# Patient Record
Sex: MALE | Race: WHITE | URBAN - METROPOLITAN AREA
[De-identification: names, ages, dates, MRNs, and addresses within clinical notes are randomized per-mention and may not be internally consistent; named-entity substitution may affect disease eponyms.]

---

## 2024-08-11 ENCOUNTER — OFFICE VISIT (OUTPATIENT)
Dept: FAMILY MEDICINE CLINIC | Facility: CLINIC | Age: 69
End: 2024-08-11
Payer: COMMERCIAL

## 2024-08-11 ENCOUNTER — HOSPITAL ENCOUNTER (EMERGENCY)
Age: 69
Discharge: LEFT BEFORE DISCHARGE | End: 2024-08-11

## 2024-08-11 VITALS
HEART RATE: 80 BPM | SYSTOLIC BLOOD PRESSURE: 142 MMHG | BODY MASS INDEX: 31.86 KG/M2 | DIASTOLIC BLOOD PRESSURE: 84 MMHG | TEMPERATURE: 98 F | WEIGHT: 203 LBS | OXYGEN SATURATION: 97 % | HEIGHT: 67 IN | RESPIRATION RATE: 20 BRPM

## 2024-08-11 DIAGNOSIS — R22.1 LOCALIZED SWELLING, MASS AND LUMP, NECK: ICD-10-CM

## 2024-08-11 DIAGNOSIS — H92.02 LEFT EAR PAIN: Primary | ICD-10-CM

## 2024-08-11 PROCEDURE — 99204 OFFICE O/P NEW MOD 45 MIN: CPT

## 2024-08-11 NOTE — PROGRESS NOTES
Subjective:   Patient ID: Bret Winslow is a 68 year old male.    Patient presents to clinic with complaints of left ear pain and left neck swelling for 1 month. States that symptoms began after some dental work. Denies fever.    Ear Pain   There is pain in the left ear. This is a new problem. The current episode started 1 to 4 weeks ago. The problem has been unchanged. There has been no fever. Pertinent negatives include no ear discharge. He has tried nothing for the symptoms.       History/Other:   Review of Systems   HENT:  Positive for ear pain. Negative for ear discharge.         Left neck swelling   All other systems reviewed and are negative.    No current outpatient medications on file.     Allergies:Not on File    Objective:   Physical Exam  Vitals reviewed.   Constitutional:       General: He is not in acute distress.     Appearance: Normal appearance. He is not ill-appearing.   HENT:      Head: Normocephalic and atraumatic.      Right Ear: Tympanic membrane, ear canal and external ear normal.      Left Ear: Tympanic membrane, ear canal and external ear normal.      Nose: Nose normal.      Mouth/Throat:      Mouth: Mucous membranes are moist.      Pharynx: Oropharynx is clear. No oropharyngeal exudate or posterior oropharyngeal erythema.   Neck:        Comments: Swelling to left neck  Cardiovascular:      Rate and Rhythm: Normal rate and regular rhythm.      Pulses: Normal pulses.      Heart sounds: Normal heart sounds.   Pulmonary:      Effort: Pulmonary effort is normal.      Breath sounds: Normal breath sounds.   Musculoskeletal:         General: Normal range of motion.      Cervical back: Normal range of motion. Edema present. No erythema. Normal range of motion.   Skin:     General: Skin is warm and dry.      Capillary Refill: Capillary refill takes less than 2 seconds.   Neurological:      General: No focal deficit present.      Mental Status: He is alert and oriented to person, place, and time.    Psychiatric:         Mood and Affect: Mood normal.         Behavior: Behavior normal.         Assessment & Plan:   1. Left ear pain    2. Localized swelling, mass and lump, neck        Patient referred to [facility] for further work up including possible imaging.      Meds This Visit:  Requested Prescriptions      No prescriptions requested or ordered in this encounter       Imaging & Referrals:  None

## 2024-09-06 ENCOUNTER — HOSPITAL ENCOUNTER (EMERGENCY)
Age: 69
Discharge: HOME | End: 2024-09-06
Attending: EMERGENCY MEDICINE
Payer: COMMERCIAL

## 2024-09-06 ENCOUNTER — APPOINTMENT (OUTPATIENT)
Dept: CT IMAGING | Age: 69
End: 2024-09-06
Attending: EMERGENCY MEDICINE
Payer: COMMERCIAL

## 2024-09-06 VITALS
SYSTOLIC BLOOD PRESSURE: 173 MMHG | HEART RATE: 84 BPM | WEIGHT: 193 LBS | DIASTOLIC BLOOD PRESSURE: 102 MMHG | BODY MASS INDEX: 30.29 KG/M2 | OXYGEN SATURATION: 98 % | HEIGHT: 67 IN | TEMPERATURE: 98 F | RESPIRATION RATE: 16 BRPM

## 2024-09-06 DIAGNOSIS — K14.8 TONGUE MASS: Primary | ICD-10-CM

## 2024-09-06 DIAGNOSIS — R22.1 NECK MASS: ICD-10-CM

## 2024-09-06 LAB
ALBUMIN SERPL-MCNC: 3.6 G/DL (ref 3.4–5)
ALBUMIN/GLOB SERPL: 0.9 {RATIO} (ref 1–2)
ALP LIVER SERPL-CCNC: 93 U/L
ALT SERPL-CCNC: 19 U/L
ANION GAP SERPL CALC-SCNC: 3 MMOL/L (ref 0–18)
AST SERPL-CCNC: 10 U/L (ref 15–37)
BASOPHILS # BLD AUTO: 0.05 X10(3) UL (ref 0–0.2)
BASOPHILS NFR BLD AUTO: 0.6 %
BILIRUB SERPL-MCNC: 0.5 MG/DL (ref 0.1–2)
BUN BLD-MCNC: 17 MG/DL (ref 9–23)
CALCIUM BLD-MCNC: 9.1 MG/DL (ref 8.5–10.1)
CHLORIDE SERPL-SCNC: 109 MMOL/L (ref 98–112)
CO2 SERPL-SCNC: 29 MMOL/L (ref 21–32)
CREAT BLD-MCNC: 1.11 MG/DL
EGFRCR SERPLBLD CKD-EPI 2021: 73 ML/MIN/1.73M2 (ref 60–?)
EOSINOPHIL # BLD AUTO: 0.28 X10(3) UL (ref 0–0.7)
EOSINOPHIL NFR BLD AUTO: 3.3 %
ERYTHROCYTE [DISTWIDTH] IN BLOOD BY AUTOMATED COUNT: 12.5 %
GLOBULIN PLAS-MCNC: 3.9 G/DL (ref 2.8–4.4)
GLUCOSE BLD-MCNC: 153 MG/DL (ref 70–99)
HCT VFR BLD AUTO: 42.1 %
HGB BLD-MCNC: 15.1 G/DL
IMM GRANULOCYTES # BLD AUTO: 0.02 X10(3) UL (ref 0–1)
IMM GRANULOCYTES NFR BLD: 0.2 %
LYMPHOCYTES # BLD AUTO: 1.66 X10(3) UL (ref 1–4)
LYMPHOCYTES NFR BLD AUTO: 19.6 %
MCH RBC QN AUTO: 34.2 PG (ref 26–34)
MCHC RBC AUTO-ENTMCNC: 35.9 G/DL (ref 31–37)
MCV RBC AUTO: 95.2 FL
MONOCYTES # BLD AUTO: 0.52 X10(3) UL (ref 0.1–1)
MONOCYTES NFR BLD AUTO: 6.1 %
NEUTROPHILS # BLD AUTO: 5.96 X10 (3) UL (ref 1.5–7.7)
NEUTROPHILS # BLD AUTO: 5.96 X10(3) UL (ref 1.5–7.7)
NEUTROPHILS NFR BLD AUTO: 70.2 %
OSMOLALITY SERPL CALC.SUM OF ELEC: 297 MOSM/KG (ref 275–295)
PLATELET # BLD AUTO: 250 10(3)UL (ref 150–450)
POTASSIUM SERPL-SCNC: 3.7 MMOL/L (ref 3.5–5.1)
PROT SERPL-MCNC: 7.5 G/DL (ref 6.4–8.2)
RBC # BLD AUTO: 4.42 X10(6)UL
SODIUM SERPL-SCNC: 141 MMOL/L (ref 136–145)
WBC # BLD AUTO: 8.5 X10(3) UL (ref 4–11)

## 2024-09-06 PROCEDURE — 99285 EMERGENCY DEPT VISIT HI MDM: CPT

## 2024-09-06 PROCEDURE — 99284 EMERGENCY DEPT VISIT MOD MDM: CPT

## 2024-09-06 PROCEDURE — 80053 COMPREHEN METABOLIC PANEL: CPT | Performed by: EMERGENCY MEDICINE

## 2024-09-06 PROCEDURE — 70491 CT SOFT TISSUE NECK W/DYE: CPT | Performed by: EMERGENCY MEDICINE

## 2024-09-06 PROCEDURE — 85025 COMPLETE CBC W/AUTO DIFF WBC: CPT | Performed by: EMERGENCY MEDICINE

## 2024-09-06 PROCEDURE — 36415 COLL VENOUS BLD VENIPUNCTURE: CPT

## 2024-09-06 NOTE — ED INITIAL ASSESSMENT (HPI)
Reports lymph node swelling to left side of neck for past 8 months.  States saw his primary several weeks ago and was instructed to get an u.s which he has not done.  States woke today with worsening swelling.  Denies difficulty breathing or swallowing

## 2024-09-06 NOTE — DISCHARGE INSTRUCTIONS
Follow-up with an otolaryngologist (ENT) and oral surgeon for further evaluation of the masses seen at the base your tongue and left neck.

## 2024-09-06 NOTE — ED PROVIDER NOTES
Patient Seen in: [facility] In [place]      History     Chief Complaint   Patient presents with    Lymph Node     Stated Complaint: left neck lymph swelling    Subjective:   HPI    68-year-old male presents with pain and swelling to left side of his neck that initially started approximately 6 months ago.  States he went to an urgent care last month who told to see his primary to have a scan of that area.  His primary ordered a ultrasound which is not done yet.  Patient states pain and swelling seems to be gradually worsening.  He has been taking ibuprofen with some improvement.  He does report a 30 to 40 pound weight loss since the beginning of the year.  He denies noting any enlarged lymph nodes anywhere else in his axilla or groin.  He denies any difficulty swallowing or breathing.  Denies fever or chills.      Objective:   History reviewed. No pertinent past medical history.           History reviewed. No pertinent surgical history.             Social History     Socioeconomic History    Marital status:    Tobacco Use    Smoking status: Never    Smokeless tobacco: Never   Vaping Use    Vaping status: Never Used              Review of Systems    Positive for stated Chief Complaint: Lymph Node    Other systems are as noted in HPI.  Constitutional and vital signs reviewed.      All other systems reviewed and negative except as noted above.    Physical Exam     ED Triage Vitals [09/06/24 1040]   /90   Pulse 92   Resp 16   Temp 97.5 °F (36.4 °C)   Temp src Temporal   SpO2 97 %   O2 Device None (Room air)       Current Vitals:   Vital Signs  BP: (!) 157/98  Pulse: 77  Resp: 14  Temp: 97.5 °F (36.4 °C)  Temp src: Temporal    Oxygen Therapy  SpO2: 97 %  O2 Device: None (Room air)            Physical Exam  Vitals and nursing note reviewed.   Constitutional:       General: He is not in acute distress.     Appearance: He is well-developed. He is not ill-appearing.   HENT:      Head:  Normocephalic and atraumatic.      Mouth/Throat:      Mouth: Mucous membranes are moist.      Pharynx: Oropharynx is clear. No oropharyngeal exudate.   Eyes:      General: No scleral icterus.     Extraocular Movements: Extraocular movements intact.   Neck:      Comments: Right neck with firm mass approximately 6 x 4 cm in the submandibular area extending to the lateral neck  No overlying skin changes  Musculoskeletal:      Cervical back: Neck supple.   Skin:     General: Skin is warm and dry.      Capillary Refill: Capillary refill takes less than 2 seconds.   Neurological:      Mental Status: He is alert and oriented to person, place, and time.      GCS: GCS eye subscore is 4. GCS verbal subscore is 5. GCS motor subscore is 6.   Psychiatric:         Mood and Affect: Mood normal.         Behavior: Behavior normal.               ED Course     Labs Reviewed   COMP METABOLIC PANEL (14) - Abnormal; Notable for the following components:       Result Value    Glucose 153 (*)     Calculated Osmolality 297 (*)     AST 10 (*)     A/G Ratio 0.9 (*)     All other components within normal limits   CBC WITH DIFFERENTIAL WITH PLATELET - Abnormal; Notable for the following components:    MCH 34.2 (*)     All other components within normal limits             CT SOFT TISSUE OF NECK(CONTRAST ONLY) (CPT=70491)    Result Date: 9/6/2024  CONCLUSION:  1. There is a 4.5 cm mass involving the base of the left tongue highly concerning for malignancy with adjacent 3.5 cm immanuel mass most likely metastases.  Recommend tissue diagnosis for further evaluation.   LOCATION:  [facility]    Dictated by (CST): Nithya Velasquez MD on 9/06/2024 at 2:00 PM     Finalized by (CST): Nithya Velasquez MD on 9/06/2024 at 2:09 PM               MDM      68-year-old male presents with pain and swelling to left side of his neck that initially started approximately 6 months ago.     Differential includes but is not limited to lymphadenitis, tumor, cyst    Labs are unremarkable  with normal WBC, hemoglobin, electrolytes and renal function.    Independent interpretation of CT soft tissue neck shows a mass at the base of the tongue and left side of the neck.  Radiology report reviewed as above measuring the tongue mass at 4.5 cm highly concerning for malignancy with adjacent 3.5 cm immanuel mass which is most likely metastasis.    Patient informed of these findings and need for further workup with ENT and oral surgery with biopsy.  Patient verbalized understanding.  Return precaution discussed.                               Medical Decision Making  Amount and/or Complexity of Data Reviewed  Labs: ordered. Decision-making details documented in ED Course.  Radiology: ordered and independent interpretation performed. Decision-making details documented in ED Course.        Disposition and Plan     Clinical Impression:  1. Tongue mass    2. Neck mass         Disposition:  Discharge  9/6/2024  2:22 pm    Follow-up:  Stacey Onofre MD  [address] [place] [address] [id number] [phone]    Schedule an appointment as soon as possible for a visit in 2 day(s)      Romeo Lugo MD  [address] [facility] [id number] [phone]    Schedule an appointment as soon as possible for a visit in 2 day(s)      Binu Harris DDS  [address] [facility] [address]  [phone]    Schedule an appointment as soon as possible for a visit in 2 day(s)            Medications Prescribed:  There are no discharge medications for this patient.

## 2024-09-09 PROCEDURE — 88365 INSITU HYBRIDIZATION (FISH): CPT | Performed by: PATHOLOGY

## 2024-09-13 ENCOUNTER — LAB REQUISITION (OUTPATIENT)
Age: 69
End: 2024-09-13
Payer: COMMERCIAL

## 2024-09-13 DIAGNOSIS — D48.9 NEOPLASM OF UNCERTAIN BEHAVIOR: ICD-10-CM

## 2024-09-30 ENCOUNTER — ORDER TRANSCRIPTION (OUTPATIENT)
Dept: PHYSICAL THERAPY | Facility: HOSPITAL | Age: 69
End: 2024-09-30

## 2024-09-30 DIAGNOSIS — C01 MALIGNANT NEOPLASM OF BASE OF TONGUE (HCC): Primary | ICD-10-CM

## 2024-10-17 ENCOUNTER — HOSPITAL ENCOUNTER (OUTPATIENT)
Dept: GENERAL RADIOLOGY | Facility: HOSPITAL | Age: 69
Discharge: HOME | End: 2024-10-17
Attending: RADIOLOGY
Payer: COMMERCIAL

## 2024-10-17 DIAGNOSIS — C01 MALIGNANT NEOPLASM OF BASE OF TONGUE (HCC): ICD-10-CM

## 2024-10-17 PROCEDURE — 74230 X-RAY XM SWLNG FUNCJ C+: CPT | Performed by: RADIOLOGY

## 2024-10-17 PROCEDURE — 92611 MOTION FLUOROSCOPY/SWALLOW: CPT

## 2024-10-17 NOTE — PATIENT INSTRUCTIONS
VIDEO SWALLOW STUDY    Diet Recommendations:  Solids: Soft to regular foods with extra moisture such as sauces, gravies, etc., IDDSI 6-7  Liquids: Thin, IDDSI 0    Recommended compensatory strategies:   Sit upright  Small sips  Alternate liquids and solids  Swallow twice with each bite and drink  Use dry mouth products such as Biotine to help dry mouth.    Medication Administration:  Take pills one at a time with single drinks    Further Follow-up:  Recommend dysphagia (swallowing) therapy.  Please call the number below to schedule.    Dana Bowden MA/CCC-SLP  Speech Language Pathologist  [facility] ([place]  [phone]

## 2024-10-17 NOTE — PROGRESS NOTES
ADULT VIDEOFLUOROSCOPIC SWALLOWING STUDY       ADULT VIDEOFLUOROSCOPIC SWALLOWING STUDY:   Referring Physician: Rojas      Radiologist: Dr. 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  He starts chemoRT in early November.  Pt c/o difficulty swallowing because he has dry mouth and his tongue hurts, 3/10 at rest and when swallowing.  \"It feels like I have a bad sore throat.\"  Pt denies food sticking in his throat and denies coughing and choking.  He has no shortness of breath.  He has lost 40-45 pounds in the last 10 months.  He had some teeth removed in January and February.  He will be scheduled for G-tube placement soon.  Current Diet: regular foods and liquids       Problem List  Active Problems:  Active Problems:    * No active hospital problems. *      Past Medical History  Past Medical History:    Cancer (HCC)    cancer of base of tongue    Problems with swallowing    Visual impairment    glasses        Imaging results:9/6/24 :  1. There is a 4.5 cm mass involving the base of the left tongue highly concerning for malignancy with adjacent 3.5 cm immanuel mass most likely metastases.  Recommend tissue diagnosis for further evaluation.     ASSESSMENT   DYSPHAGIA ASSESSMENT  Test completed in conjunction with Radiologist.   Food/Liquid Types Presented: puree, solid, and thin liquids.    Study Position and View:  Patient was seated upright and viewed laterally and A-P.    Pain Assessment: The patient reports tongue pain at a level of 3/10.    Oral phase:  Bilabial seal was intact with no anterior food or liquid loss.  Reduced bolus formation and control resulted in piecemeal posterior propulsion of the puree and solid bolus and intermittent premature spillage of thin liquids.  Oral transit and mastication times were mildly increased.  Mild puree and solid residue was  cleared with spontaneous second swallow.    Pharyngeal phase:  The pharyngeal response triggered at the valleculae for puree and solids and at the tongue base to pyriform sinuses for thin liquids due to intermittent premature spillage.  This resulted in laryngeal penetration before the swallow with thin liquids by cup x1. Trace remained in the laryngelal vestibule without reflexive response.  No other episodes of airway invasion occurred, including no aspiration.  Base of tongue retraction and laryngeal elevation were reduced resulting in vallecular and pyriform sinus retention with all consistencies.  Retention was mild with liquids and increased to moderate amounts with puree and solids. Pt was cued to swallow twice which reduced the retention slightly.      Esophageal phase:   Adequate flow of bolus through upper esophagus     Penetration Aspiration Scale: 3/8.  Material entered the airway, remained above the vocal cords and was not ejected from the airway.    Overall Impression: Mild oropharyngeal dysphagia characterized by one episode of laryngeal penetration before the swallow with thin liquids by cup and mild-moderate diffuse pharyngeal retention.  Recommend general to soft diet with extra moisture and thin liquids.  Dysphagia therapy is recommended to maintain PO intake and swallow function throughout and after his course of chemoRT.      FCM category and level: Swallowing, 5  PLAN   Potential: Good    Diet Recommendations:  Solids:  Soft to regular foods with extra moisture such as sauces, gravies, etc. , IDDSI 6-7  Liquids: Thin, IDDSI 0    Recommended compensatory strategies:   Sit upright  Small sips  Alternate liquids and solids  Swallow twice with each bite and drink  Use dry mouth products such as Biotine to help dry mouth.    Medication Administration:  Take pills one at a time with single drinks    Further Follow-up:  Recommend dysphagia therapy    GOALS (to be met 10-12 visits)  The patient will  tolerate soft to regular diet consistency with extra moisture and thin liquids without overt signs or symptoms of aspiration with 100 % accuracy  The patient/family/caregiver will demonstrate understanding and implementation of aspiration precautions and swallow strategies independently   Sit upright  Small sips  Alternate liquids and solids  Swallow twice with each bite and drink  Use dry mouth products such as Biotine to help dry mouth.  Patient will reduce risk of aspiration by completing dysphagia exercises to 90% accuracy     EDUCATION/INSTRUCTION  Reviewed results and recommendations with patient.  Written instructions were provided.  Agreement/Understanding verbalized and all questions answered to their apparent satisfaction.      INTERDISCIPLINARY COMMUNICATION  Reviewed results with Radiologist; agreement verbalized.        Patient was advised of these findings, precautions, recommendations and treatment options and has agreed to actively participate in planning and for this course of care.    Thank you for your referral. Please co-sign or sign and return this letter via fax as soon as possible. If you have any questions, please contact me at [phone].    Dana Bowden MA/DANYA-SLP  Speech Language Pathologist  [facility]  [phone]    Electronically signed by therapist: Dana Bowden, SLP  Physician's certification required: Yes  I certify the need for these services furnished under this plan of treatment and while under my care.    X___________________________________________________ Date____________________    Certification From: 10/17/2024  To:1/15/2025

## 2024-10-18 ENCOUNTER — TELEPHONE (OUTPATIENT)
Dept: PHYSICAL THERAPY | Facility: HOSPITAL | Age: 69
End: 2024-10-18

## 2024-10-24 ENCOUNTER — TELEPHONE (OUTPATIENT)
Dept: PHYSICAL THERAPY | Facility: HOSPITAL | Age: 69
End: 2024-10-24

## 2024-10-25 RX ORDER — GABAPENTIN 300 MG/1
300 CAPSULE ORAL 3 TIMES DAILY
COMMUNITY

## 2024-10-25 RX ORDER — HYDROCODONE BITARTRATE AND ACETAMINOPHEN 10; 325 MG/1; MG/1
1 TABLET ORAL EVERY 6 HOURS PRN
COMMUNITY

## 2024-10-28 ENCOUNTER — TELEPHONE (OUTPATIENT)
Dept: SPEECH THERAPY | Facility: HOSPITAL | Age: 69
End: 2024-10-28

## 2024-10-29 ENCOUNTER — ANESTHESIA EVENT (OUTPATIENT)
Dept: ENDOSCOPY | Facility: HOSPITAL | Age: 69
End: 2024-10-29
Payer: COMMERCIAL

## 2024-10-29 ENCOUNTER — HOSPITAL ENCOUNTER (OUTPATIENT)
Facility: HOSPITAL | Age: 69
Setting detail: HOSPITAL OUTPATIENT SURGERY
Discharge: HOME | End: 2024-10-29
Attending: INTERNAL MEDICINE | Admitting: INTERNAL MEDICINE
Payer: COMMERCIAL

## 2024-10-29 ENCOUNTER — ANESTHESIA (OUTPATIENT)
Dept: ENDOSCOPY | Facility: HOSPITAL | Age: 69
End: 2024-10-29
Payer: COMMERCIAL

## 2024-10-29 VITALS
WEIGHT: 193 LBS | RESPIRATION RATE: 16 BRPM | SYSTOLIC BLOOD PRESSURE: 160 MMHG | OXYGEN SATURATION: 98 % | HEART RATE: 85 BPM | DIASTOLIC BLOOD PRESSURE: 91 MMHG | TEMPERATURE: 97 F | HEIGHT: 69 IN | BODY MASS INDEX: 28.58 KG/M2

## 2024-10-29 PROCEDURE — 0DJ08ZZ INSPECTION OF UPPER INTESTINAL TRACT, VIA NATURAL OR ARTIFICIAL OPENING ENDOSCOPIC: ICD-10-PCS | Performed by: INTERNAL MEDICINE

## 2024-10-29 DEVICE — IMPLANTABLE DEVICE: Type: IMPLANTABLE DEVICE | Status: FUNCTIONAL

## 2024-10-29 RX ORDER — SODIUM CHLORIDE, SODIUM LACTATE, POTASSIUM CHLORIDE, CALCIUM CHLORIDE 600; 310; 30; 20 MG/100ML; MG/100ML; MG/100ML; MG/100ML
INJECTION, SOLUTION INTRAVENOUS CONTINUOUS
Status: DISCONTINUED | OUTPATIENT
Start: 2024-10-29 | End: 2024-10-29

## 2024-10-29 RX ORDER — CEFAZOLIN SODIUM 1 G/3ML
1 INJECTION, POWDER, FOR SOLUTION INTRAMUSCULAR; INTRAVENOUS ONCE
Status: DISCONTINUED | OUTPATIENT
Start: 2024-10-29 | End: 2024-10-29 | Stop reason: SDUPTHER

## 2024-10-29 RX ORDER — NALOXONE HYDROCHLORIDE 0.4 MG/ML
0.08 INJECTION, SOLUTION INTRAMUSCULAR; INTRAVENOUS; SUBCUTANEOUS ONCE AS NEEDED
Status: DISCONTINUED | OUTPATIENT
Start: 2024-10-29 | End: 2024-10-29

## 2024-10-29 RX ORDER — ONDANSETRON 2 MG/ML
4 INJECTION INTRAMUSCULAR; INTRAVENOUS ONCE AS NEEDED
Status: DISCONTINUED | OUTPATIENT
Start: 2024-10-29 | End: 2024-10-29

## 2024-10-29 RX ORDER — CEFAZOLIN SODIUM 1 G/3ML
2 INJECTION, POWDER, FOR SOLUTION INTRAMUSCULAR; INTRAVENOUS ONCE
Status: DISCONTINUED | OUTPATIENT
Start: 2024-10-29 | End: 2024-10-29 | Stop reason: SDUPTHER

## 2024-10-29 RX ORDER — LIDOCAINE HYDROCHLORIDE 10 MG/ML
INJECTION, SOLUTION EPIDURAL; INFILTRATION; INTRACAUDAL; PERINEURAL AS NEEDED
Status: DISCONTINUED | OUTPATIENT
Start: 2024-10-29 | End: 2024-10-29 | Stop reason: SURG

## 2024-10-29 RX ADMIN — LIDOCAINE HYDROCHLORIDE 20 MG: 10 INJECTION, SOLUTION EPIDURAL; INFILTRATION; INTRACAUDAL; PERINEURAL at 16:03:00

## 2024-10-29 RX ADMIN — SODIUM CHLORIDE, SODIUM LACTATE, POTASSIUM CHLORIDE, CALCIUM CHLORIDE: 600; 310; 30; 20 INJECTION, SOLUTION INTRAVENOUS at 16:01:00

## 2024-10-29 RX ADMIN — SODIUM CHLORIDE, SODIUM LACTATE, POTASSIUM CHLORIDE, CALCIUM CHLORIDE: 600; 310; 30; 20 INJECTION, SOLUTION INTRAVENOUS at 16:15:00

## 2024-10-29 NOTE — ANESTHESIA PREPROCEDURE EVALUATION
PRE-OP EVALUATION    Patient Name: Bret Winslow    Admit Diagnosis: cancer of base of tongue    Pre-op Diagnosis: cancer of base of tongue    ESOPHAGOGASTRODUODENOSCOPY (EGD) WITH G-TUBE PLACEMENT    Anesthesia Procedure: ESOPHAGOGASTRODUODENOSCOPY (EGD) WITH G-TUBE PLACEMENT    Surgeons and Role:     * Roman Snowden MD - Primary    Pre-op vitals reviewed.  Temp: 98.7 °F (37.1 °C)  Pulse: 84  Resp: 18  BP: 165/83  SpO2: 95 %  Body mass index is 28.5 kg/m².    Current medications reviewed.  Hospital Medications:   lactated ringers infusion   Intravenous Continuous    ceFAZolin (Ancef) 2g in 10mL IV syringe premix  2 g Intravenous Once    ceFAZolin (Ancef) 2 g/10mL IV syringe premix           Outpatient Medications:   Prescriptions Prior to Admission[1]    Allergies: Patient has no known allergies.      Anesthesia Evaluation  There is no problem list on file for this patient.       Past Medical History:    Cancer (HCC)    cancer of base of tongue    Hearing impaired person, bilateral    ringing left ear    Problems with swallowing    Visual impairment    glasses        History reviewed. No pertinent surgical history.  Social History     Socioeconomic History    Marital status:    Tobacco Use    Smoking status: Never    Smokeless tobacco: Never   Vaping Use    Vaping status: Never Used   Substance and Sexual Activity    Alcohol use: Yes     Comment: once per month    Drug use: Not Currently     History   Drug Use Unknown     Available pre-op labs reviewed.  Lab Results   Component Value Date    WBC 8.5 09/06/2024    RBC 4.42 09/06/2024    HGB 15.1 09/06/2024    HCT 42.1 09/06/2024    MCV 95.2 09/06/2024    MCH 34.2 (H) 09/06/2024    MCHC 35.9 09/06/2024    RDW 12.5 09/06/2024    .0 09/06/2024     Lab Results   Component Value Date     09/06/2024    K 3.7 09/06/2024     09/06/2024    CO2 29.0 09/06/2024    BUN 17 09/06/2024    CREATSERUM 1.11 09/06/2024     (H) 09/06/2024    CA  9.1 09/06/2024            Airway  Comment: Left neck fullness related to tongue CA, no distortion of trachea or airway    Mallampati: III  Mouth opening: >3 FB  TM distance: 4 - 6 cm  Neck ROM: full Cardiovascular      Rhythm: regular  Rate: normal     Dental    Dentition appears grossly intact         Pulmonary    Pulmonary exam normal.  Breath sounds clear to auscultation bilaterally.               Other findings              ASA: 3   Plan: MAC  NPO status verified and     Post-procedure pain management plan discussed with surgeon and patient.    Comment: Plan is MAC anesthesia, which likely will include deep sedation.  Implied that memory of procedure is unlikely although intraop recall, if it occurs, may be a reasonable and comfortable experience with this anesthetic.  Aware that general anesthesia is not intended though deep sedation may include brief moments of general anesthesia.   Questions answered. Accepts. The consent was signed without further questions.   Plan/risks discussed with: patient  Use of blood product(s) discussed with: patient    Consented to blood products.          Present on Admission:  **None**             [1]   Medications Prior to Admission   Medication Sig Dispense Refill Last Dose/Taking    gabapentin 300 MG Oral Cap Take 1 capsule (300 mg total) by mouth 3 (three) times daily.   10/29/2024 Morning    HYDROcodone-acetaminophen  MG Oral Tab Take 1 tablet by mouth every 6 (six) hours as needed for Pain.   10/26/2024      DISPLAY PLAN FREE TEXT

## 2024-10-29 NOTE — H&P
History & Physical Examination    Patient Name: Bret Winslow  MRN: [id number]  [facility]: [id number]  YOB: 1955    Diagnosis: cancer of base of tongue      Present Illness:  Bret Winslow is a 69 year old male is here cancer of base of tongue.    Body mass index is 28.5 kg/m².    Past Medical History:    Cancer (HCC)    cancer of base of tongue    Hearing impaired person, bilateral    ringing left ear    Problems with swallowing    Visual impairment    glasses       Procedure: EGD    Physician Pre-Sedation Assessment    Pre-Sedation Assessment:    Sedation History: Airway Assessed    ASA Classification: 2. Patient with mild systemic disease    Cardiac:    Respiratory:    Abdomen:      Plan: MAC        Current Facility-Administered Medications   Medication Dose Route Frequency    lactated ringers infusion   Intravenous Continuous    ceFAZolin (Ancef) 2g in 10mL IV syringe premix  2 g Intravenous Once    ceFAZolin (Ancef) 2 g/10mL IV syringe premix           Allergies: Allergies[1]    History reviewed. No pertinent surgical history.  History reviewed. No pertinent family history.  Social History     Tobacco Use    Smoking status: Never    Smokeless tobacco: Never   Substance Use Topics    Alcohol use: Yes     Comment: once per month       SYSTEM Check if Review is Normal Check if Physical Exam is Normal If not normal, please explain:   HEENT [x ] [x ]    NECK & BACK [x ] [x ]    HEART [x ] [x ]    LUNGS [x ] [x ]    ABDOMEN [x ] [x ]    UROGENITAL [ ] [ ]    EXTREMITIES [ ] [ ]    OTHER        [ x ] I have discussed the risks and benefits and alternatives with the patient/family.  They understand and agree to proceed with plan of care.  [ x ] I have reviewed the History and Physical done within the last 30 days.  Any changes noted above.    Roman Snowden MD  10/29/2024  3:53 PM             [1] No Known Allergies

## 2024-10-29 NOTE — DISCHARGE INSTRUCTIONS
Home Care Instructions Following Esophagogastroduodenoscopy with Gastrostomy Tube Placement with Sedation    Diet:  Your Physician / Home Health Agency will instruct you on tube feedings.    Medication:  Your Home Health Agency will instruct you on administration of medications through your feeding tube, if applicable.  If you have questions about resuming your normal medications, please contact your Primary Care Physician.    Activities:  Take it easy today. Do not return to work today.  Do not drive today.  Do not operate any machinery today (including kitchen equipment).    Feeding Tube Site Care:  While the site heals, clean around the site daily with warm soapy water using a clean, soft washcloth.  Cleanse the site using outward circles around the site.  After cleaning, rinse the area around the site with water.  Allow skin to air dry.  You may apply antibiotic ointment if prescribed by your doctor.    What to Expect:  Sore throat  Discomfort at feeding tube site  Small amount bloody discharge on dressing    Contact Your Doctor If:  You have redness, pain, swelling, or unusual drainage at the site  Sharp pain in your neck, abdomen, chest  Vomiting of blood  Oral temperature over 100°F  If the following persist: diarrhea, constipation, nausea, or dehydration.      **If unable to reach your doctor, please go to the [facility] Emergency Room**    You may be able to see your laboratory results in [employer] between 4 and 7 business days.  In some cases, your physician may not have viewed the results before they are released to [employer].  If you have questions regarding your results contact the physician who ordered the test/exam by phone or via [employer] by choosing \"Ask a Medical Question.\"

## 2024-10-29 NOTE — ANESTHESIA POSTPROCEDURE EVALUATION
[facility]    Bret Winslow Patient Status:  Hospital Outpatient Surgery   Age/Gender 69 year old male MRN [id number]   Location [facility] Attending Roman Snowden MD   Hosp Day # 0 PCP Maria Elena Goyal MD       Anesthesia Post-op Note    ESOPHAGOGASTRODUODENOSCOPY (EGD) WITH G-TUBE PLACEMENT    Procedure Summary       Date: 10/29/24 Room / Location:  ENDOSCOPY 04 /  ENDOSCOPY    Anesthesia Start: 1601 Anesthesia Stop:     Procedure: ESOPHAGOGASTRODUODENOSCOPY (EGD) WITH G-TUBE PLACEMENT Diagnosis: (cancer of base of tongue)    Surgeons: Roman Snowden MD Anesthesiologist: Sammy Stoddard MD    Anesthesia Type: MAC ASA Status: 3            Anesthesia Type: MAC    Vitals Value Taken Time   /85 10/29/24 1619   Temp  10/29/24 1619   Pulse 89 10/29/24 1619   Resp 16 10/29/24 1619   SpO2 97 10/29/24 1619       Patient Location: Same Day Surgery    Anesthesia Type: MAC    Airway Patency: patent    Postop Pain Control: adequate    Mental Status: preanesthetic baseline    Nausea/Vomiting: none    Cardiopulmonary/Hydration status: stable euvolemic    Complications: no apparent anesthesia related complications    Postop vital signs: stable    Dental Exam: Unchanged from Preop    Patient to be discharged from PACU when criteria met.

## 2024-10-29 NOTE — OPERATIVE REPORT
[facility] OPERATIVE REPORT   PATIENT NAME: Bret Winslow  MRN: [id number]  DATE OF OPERATION: 10/29/2024  PREOPERATIVE DIAGNOSIS: Malnutrition; PEG placement  POSTOPERATIVE DIAGNOSIS:    1.  Successful PEG placement  PROCEDURE PERFORMED: Esophagogastroduodenoscopy with PEG placement  SEDATION MEDICATIONS: MAC;  Ancef 2 gm IVPD prior to procedure; topical cetacaine spray  PREPROCEDURE ASSESSMENT: The indication for this procedure is to assess for placement of PEG tube. The patient was identified by myself and nursing staff in the exam room. Informed consent was obtained. The patient was seen in clinic and a full H&P was obtained. On brief physical examination, airway is patent. Chest is clear. Heart has regular rate and rhythm. Abdomen is soft, nontender with good bowel sounds. A medication list was taken by nursing today and reviewed by myself. The patient is an ASA grade 2.   PROCEDURE NOTE: The procedure was completed without difficulty. The patient tolerated the procedure well. The endoscope was inserted through the mouth and advanced to the level of the duodenum, 3rd portion.  Esophagus appeared normal without stricture or esophagitis.  No hiatal hernia or Caldwell's esophagus was noted.  Stomach was normal in the antrum and the body.  Duodenum was normal in the bulb and 2nd duodenum.  Retroflexed view in the stomach revealed normal fundus and cardia.   The anterior abdomen was prep for procedure.  Excess hair was shaved and area was treated with betadine.  Sterile field was created.  Air was insufflated in the stomach.  With transillumination from the scope and finger indentation, an area was identified for PEG placement.  Area was marked with marker.  1% lidocaine was applied to the superficial wall and the needle was passed further into the wall and more lidocaine applied.  Needle was seen to traverse the gastric wall and seen endoscopically to appear.  A 1 cm incision was made along the anterior  abdominal wall for passage of PEG tube.  A longer trocar was then placed in the same area and was seen to appear in the stomach endoscopically.  It was grabbed with a snare.  The needle was removed from the trocar.  A blue wire was placed through the trocar and grabbed with the snare and pulled through the mouth with the scope.  Over the wire, a 20 FR PEG tube was pulled through the mouth.  The external bumper was attached to the wall and secured at 3 cm.  Triple antibiotics was placed between the bumper and the skin.  The scope was re-introduced and the internal bumper was seen.  It was spun from the outside and seen to spin inside.  The tube was prepared.   There were no immediate complications.   FINDINGS   Successful PEG placement  RECOMMENDATIONS:   DISCHARGE:  On discharge, the patient was given an after-visit summary detailing the procedure, findings, followup plans, and an updated medication list.     Thank you very much for the consultation.  I really appreciate it.    Roman Snowden MD

## (undated) DEVICE — 3M™ RED DOT™ MONITORING ELECTRODE WITH FOAM TAPE AND STICKY GEL, 50/BAG, 20/CASE, 72/PLT 2570: Brand: RED DOT™

## (undated) DEVICE — GLOVE,SURG,SENSICARE,ALOE,LF,PF,7: Brand: MEDLINE

## (undated) DEVICE — 10FT COMBINED O2 DELIVERY/CO2 MONITORING. FILTER WITH MICROSTREAM TYPE LUER: Brand: DUAL ADULT NASAL CANNULA

## (undated) DEVICE — V2 SPECIMEN COLLECTION MANIFOLD KIT: Brand: NEPTUNE

## (undated) DEVICE — BITEBLOCK ENDOSCP 60FR MAXI STRP

## (undated) DEVICE — 1200CC GUARDIAN II: Brand: GUARDIAN

## (undated) DEVICE — KIT CUSTOM ENDOPROCEDURE STERIS

## (undated) DEVICE — KIT VLV 5 PC AIR H2O SUCT BX ENDOGATOR CONN